# Patient Record
Sex: FEMALE | Race: WHITE | NOT HISPANIC OR LATINO | Employment: OTHER | URBAN - METROPOLITAN AREA
[De-identification: names, ages, dates, MRNs, and addresses within clinical notes are randomized per-mention and may not be internally consistent; named-entity substitution may affect disease eponyms.]

---

## 2023-03-03 PROBLEM — R94.31 ABNORMAL ECG: Status: ACTIVE | Noted: 2023-03-03

## 2023-03-03 PROBLEM — R42 LIGHTHEADEDNESS: Status: ACTIVE | Noted: 2023-03-03

## 2023-03-03 PROBLEM — R76.8 ANA POSITIVE: Status: ACTIVE | Noted: 2023-03-03

## 2023-03-03 PROBLEM — L50.9 URTICARIA: Status: ACTIVE | Noted: 2023-03-03

## 2023-03-03 PROBLEM — G47.33 OBSTRUCTIVE SLEEP APNEA, ADULT: Status: ACTIVE | Noted: 2023-03-03

## 2023-03-03 PROBLEM — R53.82 CHRONIC FATIGUE: Status: ACTIVE | Noted: 2023-03-03

## 2023-03-03 PROBLEM — L30.9 ECZEMA: Status: ACTIVE | Noted: 2023-03-03

## 2023-03-03 PROBLEM — J32.3 SPHENOID SINUSITIS: Status: ACTIVE | Noted: 2023-03-03

## 2023-03-03 PROBLEM — E23.7 PITUITARY ABNORMALITY (MULTI): Status: ACTIVE | Noted: 2023-03-03

## 2023-03-03 PROBLEM — G93.89 SUPRASELLAR MASS: Status: ACTIVE | Noted: 2023-03-03

## 2023-03-03 PROBLEM — D35.2 PITUITARY ADENOMA (MULTI): Status: ACTIVE | Noted: 2023-03-03

## 2023-03-03 PROBLEM — J34.2 DEVIATED NASAL SEPTUM: Status: ACTIVE | Noted: 2023-03-03

## 2023-03-03 PROBLEM — J32.2 ETHMOID SINUSITIS: Status: ACTIVE | Noted: 2023-03-03

## 2023-03-03 PROBLEM — R43.8 DECREASED SENSE OF SMELL: Status: ACTIVE | Noted: 2023-03-03

## 2023-03-03 PROBLEM — I10 HTN (HYPERTENSION): Status: ACTIVE | Noted: 2023-03-03

## 2023-03-03 PROBLEM — R06.83 SNORING: Status: ACTIVE | Noted: 2023-03-03

## 2023-03-03 PROBLEM — R09.82 POSTNASAL DRIP: Status: ACTIVE | Noted: 2023-03-03

## 2023-03-03 PROBLEM — R92.8 ABNORMALITY OF LEFT BREAST ON SCREENING MAMMOGRAM: Status: ACTIVE | Noted: 2023-03-03

## 2023-03-03 PROBLEM — Z86.39 HISTORY OF PITUITARY DISEASE: Status: ACTIVE | Noted: 2023-03-03

## 2023-03-03 PROBLEM — R09.81 SINUS CONGESTION: Status: ACTIVE | Noted: 2023-03-03

## 2023-03-03 PROBLEM — S86.111A: Status: ACTIVE | Noted: 2023-03-03

## 2023-03-03 PROBLEM — N95.1 MENOPAUSAL SYMPTOMS: Status: ACTIVE | Noted: 2023-03-03

## 2023-03-03 PROBLEM — M13.0 ARTHRITIS OF MULTIPLE SITES: Status: ACTIVE | Noted: 2023-03-03

## 2023-03-03 PROBLEM — D17.23 LIPOMA OF RIGHT LOWER EXTREMITY: Status: ACTIVE | Noted: 2023-03-03

## 2023-03-03 RX ORDER — ACETAMINOPHEN 500 MG
TABLET ORAL
COMMUNITY
End: 2023-03-07

## 2023-03-03 RX ORDER — METOPROLOL SUCCINATE 50 MG/1
3 TABLET, EXTENDED RELEASE ORAL DAILY
COMMUNITY
Start: 2021-10-11 | End: 2024-01-03

## 2023-03-03 RX ORDER — LOSARTAN POTASSIUM 100 MG/1
100 TABLET ORAL DAILY
COMMUNITY
End: 2023-06-06 | Stop reason: SDUPTHER

## 2023-03-07 ENCOUNTER — OFFICE VISIT (OUTPATIENT)
Dept: PRIMARY CARE | Facility: CLINIC | Age: 73
End: 2023-03-07
Payer: MEDICARE

## 2023-03-07 VITALS
DIASTOLIC BLOOD PRESSURE: 96 MMHG | SYSTOLIC BLOOD PRESSURE: 160 MMHG | WEIGHT: 225 LBS | OXYGEN SATURATION: 97 % | HEART RATE: 70 BPM | BODY MASS INDEX: 37.44 KG/M2

## 2023-03-07 DIAGNOSIS — I87.2 VENOUS STASIS DERMATITIS OF BOTH LOWER EXTREMITIES: Primary | ICD-10-CM

## 2023-03-07 DIAGNOSIS — I10 HYPERTENSION, UNSPECIFIED TYPE: ICD-10-CM

## 2023-03-07 PROCEDURE — 3080F DIAST BP >= 90 MM HG: CPT | Performed by: STUDENT IN AN ORGANIZED HEALTH CARE EDUCATION/TRAINING PROGRAM

## 2023-03-07 PROCEDURE — 1159F MED LIST DOCD IN RCRD: CPT | Performed by: STUDENT IN AN ORGANIZED HEALTH CARE EDUCATION/TRAINING PROGRAM

## 2023-03-07 PROCEDURE — 99213 OFFICE O/P EST LOW 20 MIN: CPT | Performed by: STUDENT IN AN ORGANIZED HEALTH CARE EDUCATION/TRAINING PROGRAM

## 2023-03-07 PROCEDURE — 1160F RVW MEDS BY RX/DR IN RCRD: CPT | Performed by: STUDENT IN AN ORGANIZED HEALTH CARE EDUCATION/TRAINING PROGRAM

## 2023-03-07 PROCEDURE — 3077F SYST BP >= 140 MM HG: CPT | Performed by: STUDENT IN AN ORGANIZED HEALTH CARE EDUCATION/TRAINING PROGRAM

## 2023-03-07 RX ORDER — SODIUM CHLORIDE 0.65 %
AEROSOL, SPRAY (ML) NASAL 4 TIMES DAILY
COMMUNITY
Start: 2022-05-18 | End: 2023-03-07 | Stop reason: ALTCHOICE

## 2023-03-07 RX ORDER — HYDROCORTISONE 10 MG/1
TABLET ORAL
COMMUNITY
Start: 2022-06-14 | End: 2023-03-07

## 2023-03-07 RX ORDER — HYDROCORTISONE 25 MG/G
CREAM TOPICAL 2 TIMES DAILY PRN
Qty: 30 G | Refills: 2 | Status: SHIPPED | OUTPATIENT
Start: 2023-03-07 | End: 2024-01-03 | Stop reason: ALTCHOICE

## 2023-03-07 RX ORDER — AMLODIPINE BESYLATE 5 MG/1
1 TABLET ORAL DAILY
COMMUNITY
End: 2023-03-07 | Stop reason: SINTOL

## 2023-03-07 RX ORDER — HYDROXYZINE HYDROCHLORIDE 50 MG/1
50 TABLET, FILM COATED ORAL NIGHTLY
COMMUNITY
Start: 2022-10-15 | End: 2023-03-07 | Stop reason: SDUPTHER

## 2023-03-07 RX ORDER — AMOXICILLIN 500 MG/1
1 TABLET, FILM COATED ORAL 2 TIMES DAILY
COMMUNITY
Start: 2022-06-03 | End: 2023-03-07

## 2023-03-07 RX ORDER — CEPHALEXIN 500 MG/1
CAPSULE ORAL
COMMUNITY
Start: 2022-09-21 | End: 2023-03-07

## 2023-03-07 RX ORDER — PREDNISONE 20 MG/1
TABLET ORAL
COMMUNITY
Start: 2022-09-25 | End: 2023-03-07

## 2023-03-07 RX ORDER — ACETAMINOPHEN 325 MG/1
TABLET ORAL EVERY 6 HOURS PRN
COMMUNITY
Start: 2022-05-18 | End: 2024-01-03 | Stop reason: ALTCHOICE

## 2023-03-07 RX ORDER — HYDROXYZINE HYDROCHLORIDE 50 MG/1
50 TABLET, FILM COATED ORAL NIGHTLY
Qty: 30 TABLET | Refills: 2 | Status: SHIPPED | OUTPATIENT
Start: 2023-03-07 | End: 2023-03-30

## 2023-03-07 RX ORDER — SPIRONOLACTONE 50 MG/1
TABLET, FILM COATED ORAL
COMMUNITY
End: 2023-03-07 | Stop reason: ALTCHOICE

## 2023-03-07 RX ORDER — LOSARTAN POTASSIUM 50 MG/1
1 TABLET ORAL DAILY
COMMUNITY
Start: 2022-12-15 | End: 2023-03-07

## 2023-03-07 RX ORDER — HYDROCHLOROTHIAZIDE 25 MG/1
25 TABLET ORAL DAILY
COMMUNITY
Start: 2022-10-13 | End: 2023-03-07

## 2023-03-07 NOTE — PROGRESS NOTES
Subjective   Patient ID: Pattie Gutierrez is a 72 y.o. female who presents for Rash (Rash on both legs.).    HPI 72-year-old female comes in for recurrent leg rash over the past week and a half.      Review of Systems  Constitutional: NO F, chills, or sweats    Integumentary: concern for rash    Objective   BP (!) 160/96 (BP Location: Left arm, Patient Position: Sitting, BP Cuff Size: Large adult)   Pulse 70   Wt 102 kg (225 lb)   SpO2 97%   BMI 37.44 kg/m²     Physical Exam  gen- a & o x 3, nad, pleasant    skin-positive venous dermatitis rash bilateral shins    Assessment/Plan     #1.  Venous stasis dermatitis flareup of the rash on the bilateral shins.  Prescriptions given today.  She has dermatology follow-up next week

## 2023-03-07 NOTE — PATIENT INSTRUCTIONS
1.  Venous stasis dermatitis.  Prescription for hydrocortisone cream use twice daily.  Can use Atarax as needed to help with itching.  She has dermatology follow-up in 5 days.    2.  Referral to reestablish care with cardiology

## 2023-03-29 DIAGNOSIS — I87.2 VENOUS STASIS DERMATITIS OF BOTH LOWER EXTREMITIES: ICD-10-CM

## 2023-03-29 RX ORDER — TRIAMCINOLONE ACETONIDE 1 MG/G
CREAM TOPICAL
COMMUNITY
Start: 2023-03-16 | End: 2024-01-03 | Stop reason: ALTCHOICE

## 2023-03-29 RX ORDER — ACETAMINOPHEN 500 MG
TABLET ORAL
COMMUNITY
End: 2024-01-03 | Stop reason: ALTCHOICE

## 2023-03-29 RX ORDER — NEBIVOLOL 20 MG/1
1 TABLET ORAL DAILY
COMMUNITY
Start: 2023-03-20 | End: 2024-01-22 | Stop reason: SDUPTHER

## 2023-03-29 RX ORDER — LOSARTAN POTASSIUM 100 MG/1
1 TABLET ORAL DAILY
COMMUNITY
Start: 2022-09-16 | End: 2024-02-05 | Stop reason: SDUPTHER

## 2023-03-30 RX ORDER — HYDROXYZINE HYDROCHLORIDE 50 MG/1
50 TABLET, FILM COATED ORAL NIGHTLY
Qty: 30 TABLET | Refills: 2 | Status: SHIPPED | OUTPATIENT
Start: 2023-03-30 | End: 2024-01-03 | Stop reason: ALTCHOICE

## 2023-04-26 LAB
ANION GAP IN SER/PLAS: 13 MMOL/L (ref 10–20)
CALCIUM (MG/DL) IN SER/PLAS: 9.9 MG/DL (ref 8.6–10.6)
CARBON DIOXIDE, TOTAL (MMOL/L) IN SER/PLAS: 31 MMOL/L (ref 21–32)
CHLORIDE (MMOL/L) IN SER/PLAS: 100 MMOL/L (ref 98–107)
CREATININE (MG/DL) IN SER/PLAS: 0.88 MG/DL (ref 0.5–1.05)
GFR FEMALE: 69 ML/MIN/1.73M2
GLUCOSE (MG/DL) IN SER/PLAS: 80 MG/DL (ref 74–99)
POTASSIUM (MMOL/L) IN SER/PLAS: 4.4 MMOL/L (ref 3.5–5.3)
SODIUM (MMOL/L) IN SER/PLAS: 140 MMOL/L (ref 136–145)
UREA NITROGEN (MG/DL) IN SER/PLAS: 17 MG/DL (ref 6–23)

## 2023-05-23 DIAGNOSIS — I10 PRIMARY HYPERTENSION: Primary | ICD-10-CM

## 2023-05-23 RX ORDER — HYDROCHLOROTHIAZIDE 25 MG/1
25 TABLET ORAL DAILY
COMMUNITY
Start: 2023-04-17 | End: 2023-05-23 | Stop reason: SDUPTHER

## 2023-05-23 RX ORDER — HYDROCHLOROTHIAZIDE 25 MG/1
25 TABLET ORAL DAILY
Qty: 90 TABLET | Refills: 3 | Status: SHIPPED | OUTPATIENT
Start: 2023-05-23 | End: 2024-05-09

## 2023-05-23 RX ORDER — HYDRALAZINE HYDROCHLORIDE 25 MG/1
25 TABLET, FILM COATED ORAL 2 TIMES DAILY
COMMUNITY
Start: 2023-04-27 | End: 2024-05-09 | Stop reason: SDUPTHER

## 2023-06-06 ENCOUNTER — OFFICE VISIT (OUTPATIENT)
Dept: PRIMARY CARE | Facility: CLINIC | Age: 73
End: 2023-06-06
Payer: MEDICARE

## 2023-06-06 VITALS
WEIGHT: 225 LBS | OXYGEN SATURATION: 97 % | BODY MASS INDEX: 37.44 KG/M2 | SYSTOLIC BLOOD PRESSURE: 132 MMHG | HEART RATE: 67 BPM | DIASTOLIC BLOOD PRESSURE: 84 MMHG

## 2023-06-06 DIAGNOSIS — S00.03XA HEMATOMA OF RIGHT PARIETAL SCALP, INITIAL ENCOUNTER: Primary | ICD-10-CM

## 2023-06-06 PROBLEM — R06.02 SOB (SHORTNESS OF BREATH): Status: ACTIVE | Noted: 2023-06-06

## 2023-06-06 PROBLEM — R55 PRE-SYNCOPE: Status: ACTIVE | Noted: 2023-06-06

## 2023-06-06 PROBLEM — R05.3 CHRONIC COUGH: Status: ACTIVE | Noted: 2023-06-06

## 2023-06-06 PROCEDURE — 3079F DIAST BP 80-89 MM HG: CPT | Performed by: STUDENT IN AN ORGANIZED HEALTH CARE EDUCATION/TRAINING PROGRAM

## 2023-06-06 PROCEDURE — 3075F SYST BP GE 130 - 139MM HG: CPT | Performed by: STUDENT IN AN ORGANIZED HEALTH CARE EDUCATION/TRAINING PROGRAM

## 2023-06-06 PROCEDURE — 1160F RVW MEDS BY RX/DR IN RCRD: CPT | Performed by: STUDENT IN AN ORGANIZED HEALTH CARE EDUCATION/TRAINING PROGRAM

## 2023-06-06 PROCEDURE — 99213 OFFICE O/P EST LOW 20 MIN: CPT | Performed by: STUDENT IN AN ORGANIZED HEALTH CARE EDUCATION/TRAINING PROGRAM

## 2023-06-06 PROCEDURE — 1159F MED LIST DOCD IN RCRD: CPT | Performed by: STUDENT IN AN ORGANIZED HEALTH CARE EDUCATION/TRAINING PROGRAM

## 2023-06-06 NOTE — PROGRESS NOTES
Subjective   Patient ID: Pattie Gutierrez is a 73 y.o. female who presents for Mass (Mass on back of head.).    HPI comes in with 4 days of an atypical lump right posterior scalp.  Denies any trauma mild discomfort when palpating otherwise no pain no redness no tenderness.  It is in the scalp line, hairline    Review of Systems  Constitutional: NO F, chills, or sweats  Eyes: no blurred vision or visual disturbance  ENT: no hearing loss, no congestion, no nasal discharge, no hoarseness and no sore throat.   Cardiovascular: no chest pain, no edema, no palps and no syncope.   Respiratory: no cough,no s.o.b. and no wheezing  Gastrointestinal: no abdominal pain, No C/D no N/V, no blood in stools  Genitourinary: no dysuria, no change in urinary frequency, no urinary hesitancy and no feelings of urinary urgency.   Musculoskeletal: no arthralgias,  no back pain and no myalgias.   Integumentary: Concern for scalp lump  Objective   /84 (BP Location: Left arm, Patient Position: Sitting, BP Cuff Size: Large adult)   Pulse 67   Wt 102 kg (225 lb)   SpO2 97%   BMI 37.44 kg/m²     Physical Exam  gen- a & o x 3, nad, pleasant  Derm-right posterior scalp behind the right ear, there is a small 2 x 2 centimeter hematoma mildly tender there is no erythema no exudate  Assessment/Plan     #1.  Scalp hematoma watchful waiting at this time.  Icing twice per day.  We did discuss possible ultrasound if for some reason symptoms worsen.        no

## 2023-09-22 ENCOUNTER — TELEPHONE (OUTPATIENT)
Dept: ENT CLINIC | Age: 73
End: 2023-09-22

## 2024-01-03 ENCOUNTER — OFFICE VISIT (OUTPATIENT)
Dept: PRIMARY CARE | Facility: CLINIC | Age: 74
End: 2024-01-03
Payer: MEDICARE

## 2024-01-03 VITALS
OXYGEN SATURATION: 91 % | TEMPERATURE: 97.9 F | DIASTOLIC BLOOD PRESSURE: 78 MMHG | WEIGHT: 228.4 LBS | HEIGHT: 65 IN | BODY MASS INDEX: 38.05 KG/M2 | HEART RATE: 93 BPM | SYSTOLIC BLOOD PRESSURE: 122 MMHG

## 2024-01-03 DIAGNOSIS — Z13.6 ENCOUNTER FOR SCREENING FOR CARDIOVASCULAR DISORDERS: ICD-10-CM

## 2024-01-03 DIAGNOSIS — E55.9 VITAMIN D DEFICIENCY: ICD-10-CM

## 2024-01-03 DIAGNOSIS — R53.82 CHRONIC FATIGUE: ICD-10-CM

## 2024-01-03 DIAGNOSIS — Z00.00 WELLNESS EXAMINATION: Primary | ICD-10-CM

## 2024-01-03 DIAGNOSIS — I10 HYPERTENSION, UNSPECIFIED TYPE: ICD-10-CM

## 2024-01-03 DIAGNOSIS — Z13.29 SCREENING FOR THYROID DISORDER: ICD-10-CM

## 2024-01-03 DIAGNOSIS — Z13.0 SCREENING FOR DEFICIENCY ANEMIA: ICD-10-CM

## 2024-01-03 LAB
25(OH)D3 SERPL-MCNC: 34 NG/ML (ref 30–100)
ALBUMIN SERPL BCP-MCNC: 4.3 G/DL (ref 3.4–5)
ALP SERPL-CCNC: 59 U/L (ref 33–136)
ALT SERPL W P-5'-P-CCNC: 25 U/L (ref 7–45)
ANION GAP SERPL CALC-SCNC: 14 MMOL/L (ref 10–20)
AST SERPL W P-5'-P-CCNC: 23 U/L (ref 9–39)
BASOPHILS # BLD AUTO: 0.04 X10*3/UL (ref 0–0.1)
BASOPHILS NFR BLD AUTO: 0.4 %
BILIRUB SERPL-MCNC: 0.8 MG/DL (ref 0–1.2)
BUN SERPL-MCNC: 14 MG/DL (ref 6–23)
CALCIUM SERPL-MCNC: 10.1 MG/DL (ref 8.6–10.6)
CHLORIDE SERPL-SCNC: 103 MMOL/L (ref 98–107)
CHOLEST SERPL-MCNC: 208 MG/DL (ref 0–199)
CHOLESTEROL/HDL RATIO: 5.2
CO2 SERPL-SCNC: 27 MMOL/L (ref 21–32)
CREAT SERPL-MCNC: 0.99 MG/DL (ref 0.5–1.05)
EOSINOPHIL # BLD AUTO: 0.19 X10*3/UL (ref 0–0.4)
EOSINOPHIL NFR BLD AUTO: 2 %
ERYTHROCYTE [DISTWIDTH] IN BLOOD BY AUTOMATED COUNT: 13.5 % (ref 11.5–14.5)
GFR SERPL CREATININE-BSD FRML MDRD: 60 ML/MIN/1.73M*2
GLUCOSE SERPL-MCNC: 89 MG/DL (ref 74–99)
HCT VFR BLD AUTO: 45.5 % (ref 36–46)
HDLC SERPL-MCNC: 39.8 MG/DL
HGB BLD-MCNC: 15.4 G/DL (ref 12–16)
IMM GRANULOCYTES # BLD AUTO: 0.03 X10*3/UL (ref 0–0.5)
IMM GRANULOCYTES NFR BLD AUTO: 0.3 % (ref 0–0.9)
LDLC SERPL CALC-MCNC: 122 MG/DL
LYMPHOCYTES # BLD AUTO: 2.38 X10*3/UL (ref 0.8–3)
LYMPHOCYTES NFR BLD AUTO: 24.6 %
MCH RBC QN AUTO: 30.6 PG (ref 26–34)
MCHC RBC AUTO-ENTMCNC: 33.8 G/DL (ref 32–36)
MCV RBC AUTO: 90 FL (ref 80–100)
MONOCYTES # BLD AUTO: 0.61 X10*3/UL (ref 0.05–0.8)
MONOCYTES NFR BLD AUTO: 6.3 %
NEUTROPHILS # BLD AUTO: 6.41 X10*3/UL (ref 1.6–5.5)
NEUTROPHILS NFR BLD AUTO: 66.4 %
NON HDL CHOLESTEROL: 168 MG/DL (ref 0–149)
NRBC BLD-RTO: 0 /100 WBCS (ref 0–0)
PLATELET # BLD AUTO: 284 X10*3/UL (ref 150–450)
POTASSIUM SERPL-SCNC: 3.8 MMOL/L (ref 3.5–5.3)
PROT SERPL-MCNC: 6.9 G/DL (ref 6.4–8.2)
RBC # BLD AUTO: 5.04 X10*6/UL (ref 4–5.2)
SODIUM SERPL-SCNC: 140 MMOL/L (ref 136–145)
T4 FREE SERPL-MCNC: 1 NG/DL (ref 0.78–1.48)
TRIGL SERPL-MCNC: 230 MG/DL (ref 0–149)
TSH SERPL-ACNC: 5.07 MIU/L (ref 0.44–3.98)
VIT B12 SERPL-MCNC: 530 PG/ML (ref 211–911)
VLDL: 46 MG/DL (ref 0–40)
WBC # BLD AUTO: 9.7 X10*3/UL (ref 4.4–11.3)

## 2024-01-03 PROCEDURE — 82306 VITAMIN D 25 HYDROXY: CPT

## 2024-01-03 PROCEDURE — 36415 COLL VENOUS BLD VENIPUNCTURE: CPT

## 2024-01-03 PROCEDURE — 84439 ASSAY OF FREE THYROXINE: CPT

## 2024-01-03 PROCEDURE — 1159F MED LIST DOCD IN RCRD: CPT | Performed by: STUDENT IN AN ORGANIZED HEALTH CARE EDUCATION/TRAINING PROGRAM

## 2024-01-03 PROCEDURE — 3074F SYST BP LT 130 MM HG: CPT | Performed by: STUDENT IN AN ORGANIZED HEALTH CARE EDUCATION/TRAINING PROGRAM

## 2024-01-03 PROCEDURE — 84443 ASSAY THYROID STIM HORMONE: CPT

## 2024-01-03 PROCEDURE — 80053 COMPREHEN METABOLIC PANEL: CPT

## 2024-01-03 PROCEDURE — 1126F AMNT PAIN NOTED NONE PRSNT: CPT | Performed by: STUDENT IN AN ORGANIZED HEALTH CARE EDUCATION/TRAINING PROGRAM

## 2024-01-03 PROCEDURE — G0439 PPPS, SUBSEQ VISIT: HCPCS | Performed by: STUDENT IN AN ORGANIZED HEALTH CARE EDUCATION/TRAINING PROGRAM

## 2024-01-03 PROCEDURE — 80061 LIPID PANEL: CPT

## 2024-01-03 PROCEDURE — 1160F RVW MEDS BY RX/DR IN RCRD: CPT | Performed by: STUDENT IN AN ORGANIZED HEALTH CARE EDUCATION/TRAINING PROGRAM

## 2024-01-03 PROCEDURE — 99214 OFFICE O/P EST MOD 30 MIN: CPT | Performed by: STUDENT IN AN ORGANIZED HEALTH CARE EDUCATION/TRAINING PROGRAM

## 2024-01-03 PROCEDURE — 1170F FXNL STATUS ASSESSED: CPT | Performed by: STUDENT IN AN ORGANIZED HEALTH CARE EDUCATION/TRAINING PROGRAM

## 2024-01-03 PROCEDURE — 82607 VITAMIN B-12: CPT

## 2024-01-03 PROCEDURE — 3078F DIAST BP <80 MM HG: CPT | Performed by: STUDENT IN AN ORGANIZED HEALTH CARE EDUCATION/TRAINING PROGRAM

## 2024-01-03 PROCEDURE — 85025 COMPLETE CBC W/AUTO DIFF WBC: CPT

## 2024-01-03 RX ORDER — DOCUSATE SODIUM 50MG AND SENNOSIDES 8.6MG 8.6; 5 MG/1; MG/1
1 TABLET, FILM COATED ORAL DAILY
COMMUNITY
Start: 2023-08-16 | End: 2024-01-03 | Stop reason: ALTCHOICE

## 2024-01-03 RX ORDER — LOSARTAN POTASSIUM AND HYDROCHLOROTHIAZIDE 12.5; 5 MG/1; MG/1
TABLET ORAL EVERY 24 HOURS
COMMUNITY
End: 2024-01-03 | Stop reason: ALTCHOICE

## 2024-01-03 RX ORDER — OXYCODONE HYDROCHLORIDE 5 MG/1
TABLET ORAL
COMMUNITY
Start: 2023-08-16 | End: 2024-01-03 | Stop reason: ALTCHOICE

## 2024-01-03 RX ORDER — DEXTROMETHORPHAN HYDROBROMIDE, GUAIFENESIN 5; 100 MG/5ML; MG/5ML
LIQUID ORAL
COMMUNITY
End: 2024-01-03 | Stop reason: ALTCHOICE

## 2024-01-03 RX ORDER — VIBEGRON 75 MG/1
75 TABLET, FILM COATED ORAL
COMMUNITY
Start: 2024-01-02 | End: 2024-05-09 | Stop reason: WASHOUT

## 2024-01-03 ASSESSMENT — ACTIVITIES OF DAILY LIVING (ADL)
GROCERY_SHOPPING: INDEPENDENT
DOING_HOUSEWORK: INDEPENDENT
MANAGING_FINANCES: INDEPENDENT
TAKING_MEDICATION: INDEPENDENT
DRESSING: INDEPENDENT
BATHING: INDEPENDENT

## 2024-01-03 ASSESSMENT — PATIENT HEALTH QUESTIONNAIRE - PHQ9
SUM OF ALL RESPONSES TO PHQ9 QUESTIONS 1 AND 2: 0
2. FEELING DOWN, DEPRESSED OR HOPELESS: NOT AT ALL
1. LITTLE INTEREST OR PLEASURE IN DOING THINGS: NOT AT ALL

## 2024-01-03 ASSESSMENT — ENCOUNTER SYMPTOMS
LOSS OF SENSATION IN FEET: 0
OCCASIONAL FEELINGS OF UNSTEADINESS: 0
DEPRESSION: 0

## 2024-01-03 ASSESSMENT — PAIN SCALES - GENERAL: PAINLEVEL: 0-NO PAIN

## 2024-01-03 NOTE — PROGRESS NOTES
"Subjective   Patient ID: Pattie Gutierrez is a 73 y.o. female who presents for Annual Exam (Assessment annual medicare wellness fasting bw).    HPI comes in for wellness    Review of Systems  Constitutional: NO F, chills, or sweats  Eyes: no blurred vision or visual disturbance  ENT: no hearing loss, no congestion, no nasal discharge, no hoarseness and no sore throat.   Cardiovascular: no chest pain, no edema, no palps and no syncope.   Respiratory: no cough,no s.o.b. and no wheezing  Gastrointestinal: no abdominal pain, No C/D no N/V, no blood in stools  Genitourinary: no dysuria, no change in urinary frequency, no urinary hesitancy and no feelings of urinary urgency.   Musculoskeletal: no arthralgias,  no back pain and no myalgias.   Integumentary: no new skin lesions and no rashes.   Neurological: no difficulty walking, no headache, no limb weakness, no numbness and no tingling.   Psychiatric: no anxiety, no depression, no anhedonia and no substance use disorders.   Endocrine: no recent weight gain and no recent weight loss.   Hematologic/Lymphatic: no tendency for easy bruising and no swollen glands.  Objective   /78 (BP Location: Left arm)   Pulse 93   Temp 36.6 °C (97.9 °F) (Temporal)   Ht 1.651 m (5' 5\")   Wt 104 kg (228 lb 6.4 oz)   SpO2 91%   BMI 38.01 kg/m²     Physical Exam  gen- a & o x 3, nad, pleasant  heent- eomi, perrla, ear canals patent, TM's non-erythematous, no fluid, frontal and maxillary sinus's nontender  neck- supple, nontender, no palpable or enlarged nodes, no thyromegaly  heart- rrr, no murmurs  lungs- cta b/l , no w/r/r  chest- symmetric, nontender  ab- soft, nontender, no palpable organomegaly, postive bowel sounds  ex's- no c/c/e  neuro- CNs 2-12 grossly intact, full sensation and strength in all extremities    Assessment/Plan     1.  Medicare wellness.  No concerns on exam.  Screening labs ordered today.  Mammogram was October 2022.  Colonoscopy was in 2023.  Defers on shots at " this time.    2.  History of hypertension.  Stable on current meds please call if refills needed.    Continue visits with specialist providers.    #3.  History of pituitary tumor with resection.  MRI in September showed possible residual tumor.  A follow-up MRI was ordered by specialist provider order printed today.

## 2024-01-03 NOTE — PATIENT INSTRUCTIONS
1.  Medicare wellness.  No concerns on exam.  Screening labs ordered today.  Mammogram was October 2022.  Colonoscopy was in 2023.  Defers on shots at this time.    2.  History of hypertension.  Stable on current meds please call if refills needed.    Continue visits with specialist providers.    #3.  History of pituitary tumor with resection.  MRI in September showed possible residual tumor.  A follow-up MRI was ordered by specialist provider order printed today.    Brian Begum DO  for questions and f/u please call office   [place] [phone] [facility] [phone]  any forms needed please fax   [place] [phone] [facility] [phone]  for Physical therapy orders can call [phone]  for Radiology orders can call [phone]  for general referrals can call [phone]  for cardiac testing can call [phone]  for GI testing call [phone]

## 2024-01-22 DIAGNOSIS — I10 HYPERTENSION, UNSPECIFIED TYPE: Primary | ICD-10-CM

## 2024-01-22 RX ORDER — NEBIVOLOL 20 MG/1
20 TABLET ORAL DAILY
Qty: 90 TABLET | Refills: 1 | Status: SHIPPED | OUTPATIENT
Start: 2024-01-22

## 2024-01-23 ENCOUNTER — SPECIALTY PHARMACY (OUTPATIENT)
Dept: PHARMACY | Facility: CLINIC | Age: 74
End: 2024-01-23

## 2024-02-05 DIAGNOSIS — I10 HYPERTENSION, UNSPECIFIED TYPE: Primary | ICD-10-CM

## 2024-02-05 RX ORDER — LOSARTAN POTASSIUM 100 MG/1
100 TABLET ORAL DAILY
Qty: 90 TABLET | Refills: 3 | Status: SHIPPED | OUTPATIENT
Start: 2024-02-05

## 2024-02-15 ENCOUNTER — TELEPHONE (OUTPATIENT)
Dept: CARDIOLOGY | Facility: CLINIC | Age: 74
End: 2024-02-15
Payer: MEDICARE

## 2024-02-15 ENCOUNTER — SPECIALTY PHARMACY (OUTPATIENT)
Dept: PHARMACY | Facility: CLINIC | Age: 74
End: 2024-02-15

## 2024-02-15 NOTE — TELEPHONE ENCOUNTER
Pt left message saying uh specialty pharmacy couldn't really do much better on price for her nebivolol so she is going to [facility]

## 2024-04-25 RX ORDER — HYDRALAZINE HYDROCHLORIDE 25 MG/1
TABLET, FILM COATED ORAL
Refills: 0 | OUTPATIENT
Start: 2024-04-25

## 2024-05-09 ENCOUNTER — OFFICE VISIT (OUTPATIENT)
Dept: CARDIOLOGY | Facility: CLINIC | Age: 74
End: 2024-05-09
Payer: MEDICARE

## 2024-05-09 ENCOUNTER — LAB (OUTPATIENT)
Dept: LAB | Facility: LAB | Age: 74
End: 2024-05-09
Payer: MEDICARE

## 2024-05-09 VITALS
HEART RATE: 70 BPM | BODY MASS INDEX: 37.99 KG/M2 | HEIGHT: 65 IN | SYSTOLIC BLOOD PRESSURE: 119 MMHG | WEIGHT: 228 LBS | DIASTOLIC BLOOD PRESSURE: 76 MMHG

## 2024-05-09 DIAGNOSIS — I10 PRIMARY HYPERTENSION: Primary | ICD-10-CM

## 2024-05-09 DIAGNOSIS — E55.9 VITAMIN D DEFICIENCY: ICD-10-CM

## 2024-05-09 DIAGNOSIS — R53.83 FATIGUE, UNSPECIFIED TYPE: ICD-10-CM

## 2024-05-09 DIAGNOSIS — E78.2 MIXED HYPERLIPIDEMIA: ICD-10-CM

## 2024-05-09 DIAGNOSIS — I71.21 ANEURYSM OF ASCENDING AORTA WITHOUT RUPTURE (CMS-HCC): ICD-10-CM

## 2024-05-09 LAB
25(OH)D3 SERPL-MCNC: 36 NG/ML (ref 30–100)
ANION GAP SERPL CALC-SCNC: 16 MMOL/L (ref 10–20)
BUN SERPL-MCNC: 20 MG/DL (ref 6–23)
CALCIUM SERPL-MCNC: 9.9 MG/DL (ref 8.6–10.6)
CHLORIDE SERPL-SCNC: 100 MMOL/L (ref 98–107)
CO2 SERPL-SCNC: 28 MMOL/L (ref 21–32)
CREAT SERPL-MCNC: 1.02 MG/DL (ref 0.5–1.05)
EGFRCR SERPLBLD CKD-EPI 2021: 57 ML/MIN/1.73M*2
ERYTHROCYTE [DISTWIDTH] IN BLOOD BY AUTOMATED COUNT: 13.6 % (ref 11.5–14.5)
GLUCOSE SERPL-MCNC: 179 MG/DL (ref 74–99)
HCT VFR BLD AUTO: 44.6 % (ref 36–46)
HGB BLD-MCNC: 15.2 G/DL (ref 12–16)
MCH RBC QN AUTO: 30.3 PG (ref 26–34)
MCHC RBC AUTO-ENTMCNC: 34.1 G/DL (ref 32–36)
MCV RBC AUTO: 89 FL (ref 80–100)
NRBC BLD-RTO: 0 /100 WBCS (ref 0–0)
PLATELET # BLD AUTO: 310 X10*3/UL (ref 150–450)
POTASSIUM SERPL-SCNC: 3.8 MMOL/L (ref 3.5–5.3)
RBC # BLD AUTO: 5.01 X10*6/UL (ref 4–5.2)
SODIUM SERPL-SCNC: 140 MMOL/L (ref 136–145)
T4 FREE SERPL-MCNC: 1.08 NG/DL (ref 0.78–1.48)
TSH SERPL-ACNC: 5.19 MIU/L (ref 0.44–3.98)
WBC # BLD AUTO: 7.7 X10*3/UL (ref 4.4–11.3)

## 2024-05-09 PROCEDURE — 36415 COLL VENOUS BLD VENIPUNCTURE: CPT

## 2024-05-09 PROCEDURE — 85027 COMPLETE CBC AUTOMATED: CPT

## 2024-05-09 PROCEDURE — 82306 VITAMIN D 25 HYDROXY: CPT

## 2024-05-09 PROCEDURE — 1160F RVW MEDS BY RX/DR IN RCRD: CPT | Performed by: NURSE PRACTITIONER

## 2024-05-09 PROCEDURE — 99214 OFFICE O/P EST MOD 30 MIN: CPT | Performed by: NURSE PRACTITIONER

## 2024-05-09 PROCEDURE — 84443 ASSAY THYROID STIM HORMONE: CPT

## 2024-05-09 PROCEDURE — 1036F TOBACCO NON-USER: CPT | Performed by: NURSE PRACTITIONER

## 2024-05-09 PROCEDURE — 80048 BASIC METABOLIC PNL TOTAL CA: CPT

## 2024-05-09 PROCEDURE — 1159F MED LIST DOCD IN RCRD: CPT | Performed by: NURSE PRACTITIONER

## 2024-05-09 PROCEDURE — 3078F DIAST BP <80 MM HG: CPT | Performed by: NURSE PRACTITIONER

## 2024-05-09 PROCEDURE — 84439 ASSAY OF FREE THYROXINE: CPT

## 2024-05-09 PROCEDURE — 3074F SYST BP LT 130 MM HG: CPT | Performed by: NURSE PRACTITIONER

## 2024-05-09 RX ORDER — HYDROCHLOROTHIAZIDE 25 MG/1
12.5 TABLET ORAL DAILY
Qty: 45 TABLET | Refills: 3 | Status: SHIPPED | OUTPATIENT
Start: 2024-05-09 | End: 2025-05-09

## 2024-05-09 RX ORDER — HYDRALAZINE HYDROCHLORIDE 25 MG/1
25 TABLET, FILM COATED ORAL 2 TIMES DAILY
Qty: 180 TABLET | Refills: 3 | Status: SHIPPED | OUTPATIENT
Start: 2024-05-09 | End: 2025-05-09

## 2024-05-09 NOTE — PROGRESS NOTES
Chief Complaint:   Hypertension and TAA     History Of Present Illness:    Pattie Gutierrez is a 74 y.o. female here with hypertension and TAA.  Getting a lot of HA.   Fatigue worse 1-2 months. Gradually increasing.   Will feel dizzy from time to time.   Just does not feel well.    TTE 4/3/2023  EF WNL  Moderate LVH  LA moderately dialted  Aortic valve sclerosis  Moderate dilation of ascending aorta (4.38)      Past Medical History:  She has a past medical history of Broken internal right knee prosthesis, initial encounter (CMS-HCC) (01/17/2019), Cellulitis of right lower limb (09/21/2022), Other abnormal and inconclusive findings on diagnostic imaging of breast (08/31/2020), Other adrenocortical insufficiency (Multi) (07/22/2022), Other conditions influencing health status, Personal history of diseases of the blood and blood-forming organs and certain disorders involving the immune mechanism (08/31/2020), Personal history of other diseases of the circulatory system, Personal history of other medical treatment, and Unspecified macular degeneration.    Past Surgical History:  She has a past surgical history that includes Rotator cuff repair (06/06/2017); Total knee arthroplasty (06/06/2017); Hysterectomy (06/06/2017); Appendectomy (06/06/2017); Hand tendon surgery (06/06/2017); and Colonoscopy (06/19/2018).      Social History:  She reports that she has quit smoking. Her smoking use included cigarettes. She does not have any smokeless tobacco history on file. No history on file for alcohol use and drug use.    Family History:  Family History   Problem Relation Name Age of Onset    Hypertension Mother      Hyperthyroidism Mother      Hyperthyroidism Sister      Hypertension Sibling      Kidney disease Sibling      Thyroid disease Sibling       Allergies:  Tramadol, Acetaminophen, Amlodipine, Carvedilol, Cetirizine, Hydrocodone bitartrate, Hydrocodone-acetaminophen, Ibuprofen, Sulfa (sulfonamide antibiotics), and  "Verapamil    Review of Systems  All pertinent systems have been reviewed and are negative except for what is stated in the history of present illness.    All other systems have been reviewed and are negative and noncontributory to this patient's current ailments.     Visit Vitals  /76 (BP Location: Right arm, Patient Position: Sitting, BP Cuff Size: Large adult)   Pulse 70   Ht 1.651 m (5' 5\")   Wt 103 kg (228 lb)   BMI 37.94 kg/m²   Smoking Status Former   BSA 2.17 m²       Last Labs:  CBC -  Lab Results   Component Value Date    WBC 9.7 01/03/2024    HGB 15.4 01/03/2024    HCT 45.5 01/03/2024    MCV 90 01/03/2024     01/03/2024       CMP -  Lab Results   Component Value Date    CALCIUM 10.1 01/03/2024    PHOS 2.9 05/18/2022    PROT 6.9 01/03/2024    ALBUMIN 4.3 01/03/2024    AST 23 01/03/2024    ALT 25 01/03/2024    ALKPHOS 59 01/03/2024    BILITOT 0.8 01/03/2024    BUN 14 01/03/2024    CREATININE 0.99 01/03/2024       LIPID PANEL -   Lab Results   Component Value Date    CHOL 208 (H) 01/03/2024    TRIG 230 (H) 01/03/2024    HDL 39.8 01/03/2024    CHHDL 5.2 01/03/2024    LDLF 122 (H) 09/16/2022    VLDL 46 (H) 01/03/2024    NHDL 168 (H) 01/03/2024       RENAL FUNCTION PANEL -   Lab Results   Component Value Date    GLUCOSE 89 01/03/2024     01/03/2024    K 3.8 01/03/2024     01/03/2024    CO2 27 01/03/2024    ANIONGAP 14 01/03/2024    BUN 14 01/03/2024    CREATININE 0.99 01/03/2024    CALCIUM 10.1 01/03/2024    PHOS 2.9 05/18/2022    ALBUMIN 4.3 01/03/2024        No results found for: \"BNP\", \"HGBA1C\"      Objective   Vitals reviewed.   Constitutional:       Appearance: Healthy appearance. Not in distress.   Eyes:      Conjunctiva/sclera: Conjunctivae normal.   Neck:      Vascular: No JVR. JVD normal.   Pulmonary:      Effort: Pulmonary effort is normal.      Breath sounds: Normal breath sounds. No wheezing. No rhonchi. No rales.   Chest:      Chest wall: Not tender to palpatation. "   Cardiovascular:      PMI at left midclavicular line. Normal rate. Regular rhythm. Normal S1. Normal S2.       Murmurs: There is no murmur.      No gallop.  No click. No rub.   Edema:     Peripheral edema absent.   Abdominal:      General: Bowel sounds are normal.      Palpations: Abdomen is soft.      Tenderness: There is no abdominal tenderness.   Musculoskeletal: Normal range of motion.         General: No tenderness. Skin:     General: Skin is warm and dry.   Neurological:      General: No focal deficit present.      Mental Status: Alert and oriented to person, place and time.   Psychiatric:         Attention and Perception: Attention normal.         Mood and Affect: Mood normal.       Assessment/Plan   Diagnoses and all orders for this visit:  Fatigue, unspecified type  - Feels worse when systolics <110  - will cut hydrochlorothiazide in half  - she will continue to monitor bp at home  - overall bps have been well controlled  - since ongoing for past 2 months we will recheck cbc, tsh, d level  Primary hypertension  - see above  Mixed hyperlipidemia  - we discussed statin, she does not want to start  - she will increase fiber in diet  - she will increase exercise  - we will recheck lipids in 2-3 months  - may need vascepa as well she is agreeable to this  Aneurysm of ascending aorta without rupture (CMS-HCC)  - 4.38 on last year echo   - continue good bp management  Vitamin D deficiency  - Vitamin D 25-Hydroxy,Total (for eval of Vitamin D levels); Future    Follow up in 2-3 weeks      Current Outpatient Medications:     losartan (Cozaar) 100 mg tablet, Take 1 tablet (100 mg) by mouth once daily., Disp: 90 tablet, Rfl: 3    nebivolol (Bystolic) 20 mg tablet, Take 1 tablet (20 mg) by mouth once daily., Disp: 90 tablet, Rfl: 1    hydrALAZINE (Apresoline) 25 mg tablet, Take 1 tablet (25 mg) by mouth 2 times a day. Take with food., Disp: 180 tablet, Rfl: 3    hydroCHLOROthiazide (HYDRODiuril) 25 mg tablet, Take 0.5  tablets (12.5 mg) by mouth once daily. as directed, Disp: 45 tablet, Rfl: 3    Exclusive of any other services or procedures performed, I, Deya HERRING, spent 30 minutes in duration for this visit today.  This time consisted of chart review, obtaining history, and/or performing the exam as documented above, as well as, documenting the clinical information for the encounter in the electronic record, discussing treatment options, plans, and/or goals with patient, family, and/or caregiver, refilling medications, updating the electronic record, ordering medicines, lab work, imaging, referrals, and/or procedures as documented above and communicating with other [facility] professionals. I have discussed the results of laboratory, radiology, and cardiology studies with the patient and their family/caregiver.

## 2024-05-29 ENCOUNTER — OFFICE VISIT (OUTPATIENT)
Dept: CARDIOLOGY | Facility: CLINIC | Age: 74
End: 2024-05-29
Payer: MEDICARE

## 2024-05-29 VITALS
HEART RATE: 58 BPM | WEIGHT: 225.1 LBS | BODY MASS INDEX: 37.5 KG/M2 | HEIGHT: 65 IN | DIASTOLIC BLOOD PRESSURE: 90 MMHG | TEMPERATURE: 98 F | SYSTOLIC BLOOD PRESSURE: 157 MMHG

## 2024-05-29 DIAGNOSIS — I71.21 ANEURYSM OF ASCENDING AORTA WITHOUT RUPTURE (CMS-HCC): ICD-10-CM

## 2024-05-29 DIAGNOSIS — E78.2 MIXED HYPERLIPIDEMIA: ICD-10-CM

## 2024-05-29 DIAGNOSIS — E55.9 VITAMIN D DEFICIENCY: ICD-10-CM

## 2024-05-29 DIAGNOSIS — R53.83 FATIGUE, UNSPECIFIED TYPE: Primary | ICD-10-CM

## 2024-05-29 DIAGNOSIS — I10 PRIMARY HYPERTENSION: ICD-10-CM

## 2024-05-29 PROCEDURE — 3080F DIAST BP >= 90 MM HG: CPT | Performed by: NURSE PRACTITIONER

## 2024-05-29 PROCEDURE — 3077F SYST BP >= 140 MM HG: CPT | Performed by: NURSE PRACTITIONER

## 2024-05-29 PROCEDURE — 1036F TOBACCO NON-USER: CPT | Performed by: NURSE PRACTITIONER

## 2024-05-29 PROCEDURE — 99214 OFFICE O/P EST MOD 30 MIN: CPT | Performed by: NURSE PRACTITIONER

## 2024-05-29 PROCEDURE — 1159F MED LIST DOCD IN RCRD: CPT | Performed by: NURSE PRACTITIONER

## 2024-05-29 PROCEDURE — 1160F RVW MEDS BY RX/DR IN RCRD: CPT | Performed by: NURSE PRACTITIONER

## 2024-05-29 NOTE — PROGRESS NOTES
Chief Complaint:   Hypertension and TAA     History Of Present Illness:    Pattie Gutierrez is a 74 y.o. female here with hypertension and TAA.  Getting a lot of HA.   Fatigue worse 1-2 months. Gradually increasing.   Will feel dizzy from time to time.   Just does not feel well.  I checked labs at last visit. Nothing untoward noted.   Cut hydrochlorothiazide in 1/2 on 5/9, she is here for follow up.   Dizziness is much improved, muscle spasms much improved.   Fatigue improved.     TTE 4/3/2023  EF WNL  Moderate LVH  LA moderately dialted  Aortic valve sclerosis  Moderate dilation of ascending aorta (4.38)      Past Medical History:  She has a past medical history of Broken internal right knee prosthesis, initial encounter (CMS-[facility]) (01/17/2019), Cellulitis of right lower limb (09/21/2022), Other abnormal and inconclusive findings on diagnostic imaging of breast (08/31/2020), Other adrenocortical insufficiency (Multi) (07/22/2022), Other conditions influencing health status, Personal history of diseases of the blood and blood-forming organs and certain disorders involving the immune mechanism (08/31/2020), Personal history of other diseases of the circulatory system, Personal history of other medical treatment, and Unspecified macular degeneration.    Past Surgical History:  She has a past surgical history that includes Rotator cuff repair (06/06/2017); Total knee arthroplasty (06/06/2017); Hysterectomy (06/06/2017); Appendectomy (06/06/2017); Hand tendon surgery (06/06/2017); and Colonoscopy (06/19/2018).      Social History:  She reports that she has quit smoking. Her smoking use included cigarettes. She has never used smokeless tobacco. No history on file for alcohol use and drug use.    Family History:  Family History   Problem Relation Name Age of Onset    Hypertension Mother      Hyperthyroidism Mother      Hyperthyroidism Sister      Hypertension Sibling      Kidney disease Sibling      Thyroid disease Sibling    "    Allergies:  Tramadol, Acetaminophen, Amlodipine, Carvedilol, Cetirizine, Hydrocodone bitartrate, Hydrocodone-acetaminophen, Ibuprofen, Sulfa (sulfonamide antibiotics), and Verapamil    Review of Systems  All pertinent systems have been reviewed and are negative except for what is stated in the history of present illness.    All other systems have been reviewed and are negative and noncontributory to this patient's current ailments.     Visit Vitals  /90   Pulse 58   Temp 36.7 °C (98 °F)   Ht 1.651 m (5' 5\")   Wt 102 kg (225 lb 1.6 oz)   BMI 37.46 kg/m²   Smoking Status Former   BSA 2.16 m²     Last Labs:  CBC -  Lab Results   Component Value Date    WBC 7.7 05/09/2024    HGB 15.2 05/09/2024    HCT 44.6 05/09/2024    MCV 89 05/09/2024     05/09/2024       CMP -  Lab Results   Component Value Date    CALCIUM 9.9 05/09/2024    PHOS 2.9 05/18/2022    PROT 6.9 01/03/2024    ALBUMIN 4.3 01/03/2024    AST 23 01/03/2024    ALT 25 01/03/2024    ALKPHOS 59 01/03/2024    BILITOT 0.8 01/03/2024    BUN 20 05/09/2024    CREATININE 1.02 05/09/2024       LIPID PANEL -   Lab Results   Component Value Date    CHOL 208 (H) 01/03/2024    TRIG 230 (H) 01/03/2024    HDL 39.8 01/03/2024    CHHDL 5.2 01/03/2024    LDLF 122 (H) 09/16/2022    VLDL 46 (H) 01/03/2024    NHDL 168 (H) 01/03/2024       RENAL FUNCTION PANEL -   Lab Results   Component Value Date    GLUCOSE 179 (H) 05/09/2024     05/09/2024    K 3.8 05/09/2024     05/09/2024    CO2 28 05/09/2024    ANIONGAP 16 05/09/2024    BUN 20 05/09/2024    CREATININE 1.02 05/09/2024    CALCIUM 9.9 05/09/2024    PHOS 2.9 05/18/2022    ALBUMIN 4.3 01/03/2024        No results found for: \"BNP\", \"HGBA1C\"      Objective   Vitals reviewed.   Constitutional:       Appearance: Healthy appearance. Not in distress.   Eyes:      Conjunctiva/sclera: Conjunctivae normal.   Neck:      Vascular: No JVR. JVD normal.   Pulmonary:      Effort: Pulmonary effort is normal.      Breath " sounds: Normal breath sounds. No wheezing. No rhonchi. No rales.   Chest:      Chest wall: Not tender to palpatation.   Cardiovascular:      PMI at left midclavicular line. Normal rate. Regular rhythm. Normal S1. Normal S2.       Murmurs: There is no murmur.      No gallop.  No click. No rub.   Edema:     Peripheral edema absent.   Abdominal:      General: Bowel sounds are normal.      Palpations: Abdomen is soft.      Tenderness: There is no abdominal tenderness.   Musculoskeletal: Normal range of motion.         General: No tenderness. Skin:     General: Skin is warm and dry.   Neurological:      General: No focal deficit present.      Mental Status: Alert and oriented to person, place and time.   Psychiatric:         Attention and Perception: Attention normal.         Mood and Affect: Mood normal.       Assessment/Plan   Diagnoses and all orders for this visit:  Fatigue, unspecified type  - much improved   Primary hypertension  - 117//90s  - not having a a lot of high bp readings.   - if elevated it is 2/2 what she ate the day prior  - more low bp and high  - manual 136/88  Mixed hyperlipidemia  - we discussed statin, she does not want to start  - she will increase fiber in diet  - she will increase exercise  - we will recheck lipids in 2-3 months  - may need vascepa as well  she is agreeable to this  Aneurysm of ascending aorta without rupture (CMS-HCC)  - 4.38 on last year echo   - continue good bp management  Vitamin D deficiency  - Vitamin D 25-Hydroxy,Total (for eval of Vitamin D levels); Future    Follow up in 6 month       Current Outpatient Medications:     hydrALAZINE (Apresoline) 25 mg tablet, Take 1 tablet (25 mg) by mouth 2 times a day. Take with food., Disp: 180 tablet, Rfl: 3    hydroCHLOROthiazide (HYDRODiuril) 25 mg tablet, Take 0.5 tablets (12.5 mg) by mouth once daily. as directed, Disp: 45 tablet, Rfl: 3    losartan (Cozaar) 100 mg tablet, Take 1 tablet (100 mg) by mouth once daily.,  Disp: 90 tablet, Rfl: 3    nebivolol (Bystolic) 20 mg tablet, Take 1 tablet (20 mg) by mouth once daily., Disp: 90 tablet, Rfl: 1    Exclusive of any other services or procedures performed, I, Deya HERRING, spent 30 minutes in duration for this visit today.  This time consisted of chart review, obtaining history, and/or performing the exam as documented above, as well as, documenting the clinical information for the encounter in the electronic record, discussing treatment options, plans, and/or goals with patient, family, and/or caregiver, refilling medications, updating the electronic record, ordering medicines, lab work, imaging, referrals, and/or procedures as documented above and communicating with other [facility] professionals. I have discussed the results of laboratory, radiology, and cardiology studies with the patient and their family/caregiver.

## 2024-07-16 ENCOUNTER — APPOINTMENT (OUTPATIENT)
Dept: PRIMARY CARE | Facility: CLINIC | Age: 74
End: 2024-07-16
Payer: MEDICARE

## 2024-07-16 VITALS
BODY MASS INDEX: 36.58 KG/M2 | OXYGEN SATURATION: 94 % | WEIGHT: 219.8 LBS | HEART RATE: 76 BPM | SYSTOLIC BLOOD PRESSURE: 122 MMHG | DIASTOLIC BLOOD PRESSURE: 80 MMHG

## 2024-07-16 DIAGNOSIS — I10 HYPERTENSION, UNSPECIFIED TYPE: ICD-10-CM

## 2024-07-16 PROBLEM — M25.561 PAIN IN RIGHT KNEE: Status: ACTIVE | Noted: 2024-07-16

## 2024-07-16 PROBLEM — R90.0 INTRACRANIAL MASS: Status: ACTIVE | Noted: 2022-05-06

## 2024-07-16 PROBLEM — M19.90 ARTHRITIS: Status: ACTIVE | Noted: 2023-03-03

## 2024-07-16 PROBLEM — M17.11 OSTEOARTHRITIS OF RIGHT KNEE: Status: ACTIVE | Noted: 2024-07-16

## 2024-07-16 PROBLEM — T75.3XXA MOTION SICKNESS: Status: ACTIVE | Noted: 2023-08-09

## 2024-07-16 PROBLEM — Z86.79 HISTORY OF HYPERTENSION: Status: ACTIVE | Noted: 2024-07-16

## 2024-07-16 PROBLEM — E66.9 OBESITY WITH BODY MASS INDEX 30 OR GREATER: Status: ACTIVE | Noted: 2024-07-16

## 2024-07-16 PROBLEM — R53.83 FATIGUE: Status: ACTIVE | Noted: 2023-03-03

## 2024-07-16 PROBLEM — Z98.890 POSTOPERATIVE STATE: Status: ACTIVE | Noted: 2024-07-16

## 2024-07-16 PROBLEM — E23.6 PITUITARY MASS (MULTI): Status: ACTIVE | Noted: 2024-07-16

## 2024-07-16 PROBLEM — H35.30 AGE-RELATED MACULAR DEGENERATION: Status: ACTIVE | Noted: 2024-07-16

## 2024-07-16 PROCEDURE — 3074F SYST BP LT 130 MM HG: CPT | Performed by: STUDENT IN AN ORGANIZED HEALTH CARE EDUCATION/TRAINING PROGRAM

## 2024-07-16 PROCEDURE — 1160F RVW MEDS BY RX/DR IN RCRD: CPT | Performed by: STUDENT IN AN ORGANIZED HEALTH CARE EDUCATION/TRAINING PROGRAM

## 2024-07-16 PROCEDURE — 1036F TOBACCO NON-USER: CPT | Performed by: STUDENT IN AN ORGANIZED HEALTH CARE EDUCATION/TRAINING PROGRAM

## 2024-07-16 PROCEDURE — 99213 OFFICE O/P EST LOW 20 MIN: CPT | Performed by: STUDENT IN AN ORGANIZED HEALTH CARE EDUCATION/TRAINING PROGRAM

## 2024-07-16 PROCEDURE — 1159F MED LIST DOCD IN RCRD: CPT | Performed by: STUDENT IN AN ORGANIZED HEALTH CARE EDUCATION/TRAINING PROGRAM

## 2024-07-16 PROCEDURE — 3079F DIAST BP 80-89 MM HG: CPT | Performed by: STUDENT IN AN ORGANIZED HEALTH CARE EDUCATION/TRAINING PROGRAM

## 2024-07-16 PROCEDURE — 1126F AMNT PAIN NOTED NONE PRSNT: CPT | Performed by: STUDENT IN AN ORGANIZED HEALTH CARE EDUCATION/TRAINING PROGRAM

## 2024-07-16 RX ORDER — NEBIVOLOL 20 MG/1
20 TABLET ORAL DAILY
Qty: 90 TABLET | Refills: 3 | Status: SHIPPED | OUTPATIENT
Start: 2024-07-16 | End: 2025-07-16

## 2024-07-16 RX ORDER — LOSARTAN POTASSIUM 100 MG/1
100 TABLET ORAL DAILY
Qty: 90 TABLET | Refills: 3 | Status: SHIPPED | OUTPATIENT
Start: 2024-07-16

## 2024-07-16 ASSESSMENT — ENCOUNTER SYMPTOMS: DEPRESSION: 0

## 2024-07-16 ASSESSMENT — PAIN SCALES - GENERAL: PAINLEVEL: 0-NO PAIN

## 2024-07-16 NOTE — PROGRESS NOTES
Subjective   Patient ID: Pattie Gutierrez is a 74 y.o. female who presents for Mass (Mass on back of right leg and both hips.) and Med Refill.    HPI comes in for blood pressure med refills.  Also she had some atypical spots right posterior thigh and bilateral groin region.    Review of Systems  Constitutional: NO F, chills, or sweats  Eyes: no blurred vision or visual disturbance  ENT: no hearing loss, no congestion, no nasal discharge, no hoarseness and no sore throat.   Cardiovascular: no chest pain, no edema, no palps and no syncope.   Respiratory: no cough,no s.o.b. and no wheezing  Gastrointestinal: no abdominal pain, No C/D no N/V, no blood in stools  Genitourinary: no dysuria, no change in urinary frequency, no urinary hesitancy and no feelings of urinary urgency.   Musculoskeletal: no arthralgias,  no back pain and no myalgias.   Integumentary: no new skin lesions and no rashes.   Neurological: no difficulty walking, no headache, no limb weakness, no numbness and no tingling.   Psychiatric: no anxiety, no depression, no anhedonia and no substance use disorders.   Endocrine: no recent weight gain and no recent weight loss.   Hematologic/Lymphatic: no tendency for easy bruising and no swollen glands.  Objective   /80 (BP Location: Left arm, Patient Position: Sitting, BP Cuff Size: Large adult)   Pulse 76   Wt 99.7 kg (219 lb 12.8 oz)   SpO2 94%   BMI 36.58 kg/m²     Physical Exam  gen- a & o x 3, nad, pleasant  skin-right posterior thigh the area seems to be mid hamstring muscle knot  Bilateral groin right more than left, muscle knots of the right upper quadrants  Assessment/Plan     #1.  Hypertension.  Med refills today

## 2024-07-31 ENCOUNTER — HOSPITAL ENCOUNTER (OUTPATIENT)
Dept: RADIOLOGY | Facility: CLINIC | Age: 74
Discharge: HOME | End: 2024-07-31
Payer: MEDICARE

## 2024-07-31 VITALS — BODY MASS INDEX: 35.45 KG/M2 | WEIGHT: 213 LBS

## 2024-07-31 DIAGNOSIS — G93.89 OTHER SPECIFIED DISORDERS OF BRAIN: ICD-10-CM

## 2024-07-31 PROCEDURE — 70553 MRI BRAIN STEM W/O & W/DYE: CPT

## 2024-07-31 PROCEDURE — 70553 MRI BRAIN STEM W/O & W/DYE: CPT | Performed by: RADIOLOGY

## 2024-07-31 PROCEDURE — 2550000001 HC RX 255 CONTRASTS: Performed by: NEUROLOGICAL SURGERY

## 2024-07-31 PROCEDURE — A9575 INJ GADOTERATE MEGLUMI 0.1ML: HCPCS | Performed by: NEUROLOGICAL SURGERY

## 2024-07-31 RX ORDER — GADOTERATE MEGLUMINE 376.9 MG/ML
20 INJECTION INTRAVENOUS
Status: COMPLETED | OUTPATIENT
Start: 2024-07-31 | End: 2024-07-31

## 2024-12-02 ENCOUNTER — APPOINTMENT (OUTPATIENT)
Dept: CARDIOLOGY | Facility: CLINIC | Age: 74
End: 2024-12-02
Payer: MEDICARE

## 2024-12-02 VITALS
WEIGHT: 214.8 LBS | HEIGHT: 65 IN | TEMPERATURE: 97.9 F | DIASTOLIC BLOOD PRESSURE: 109 MMHG | BODY MASS INDEX: 35.79 KG/M2 | HEART RATE: 66 BPM | SYSTOLIC BLOOD PRESSURE: 189 MMHG

## 2024-12-02 DIAGNOSIS — R53.83 FATIGUE, UNSPECIFIED TYPE: Primary | ICD-10-CM

## 2024-12-02 DIAGNOSIS — I10 PRIMARY HYPERTENSION: ICD-10-CM

## 2024-12-02 DIAGNOSIS — E78.2 MIXED HYPERLIPIDEMIA: ICD-10-CM

## 2024-12-02 DIAGNOSIS — I71.21 ANEURYSM OF ASCENDING AORTA WITHOUT RUPTURE (CMS-HCC): ICD-10-CM

## 2024-12-02 PROCEDURE — 3080F DIAST BP >= 90 MM HG: CPT | Performed by: NURSE PRACTITIONER

## 2024-12-02 PROCEDURE — 1036F TOBACCO NON-USER: CPT | Performed by: NURSE PRACTITIONER

## 2024-12-02 PROCEDURE — 1160F RVW MEDS BY RX/DR IN RCRD: CPT | Performed by: NURSE PRACTITIONER

## 2024-12-02 PROCEDURE — 3077F SYST BP >= 140 MM HG: CPT | Performed by: NURSE PRACTITIONER

## 2024-12-02 PROCEDURE — 99214 OFFICE O/P EST MOD 30 MIN: CPT | Performed by: NURSE PRACTITIONER

## 2024-12-02 PROCEDURE — 1159F MED LIST DOCD IN RCRD: CPT | Performed by: NURSE PRACTITIONER

## 2024-12-02 RX ORDER — OXYCODONE HYDROCHLORIDE 5 MG/1
TABLET ORAL
COMMUNITY
Start: 2024-11-14

## 2024-12-02 RX ORDER — ACETAMINOPHEN 325 MG/1
TABLET ORAL
COMMUNITY
Start: 2024-11-14

## 2024-12-02 RX ORDER — LOSARTAN POTASSIUM AND HYDROCHLOROTHIAZIDE 12.5; 5 MG/1; MG/1
1 TABLET ORAL DAILY
COMMUNITY

## 2024-12-02 NOTE — PROGRESS NOTES
"Chief Complaint:   Hypertension and TAA     History Of Present Illness:    Pattie Gutierrez is a 74 y.o. female here with hypertension and TAA.    Had surgery around 11/14. Has been losartan uninterrupted. However, blood pressure was \"low\" prior to surgery. She reports her BP was 115ish/70-60s. Hydrochlorothiazide was held. She just restarted    Started noticing BP was getting elevated over the past week per home monitoring.     Dizziness and lightheadedness remains improved.     Fatigue resolved with taking losartan at night.     Checking BP at home [id number] and noted it would be elevated. Taking losartan in PM.     TTE 4/3/2023  EF WNL  Moderate LVH  LA moderately dialted  Aortic valve sclerosis  Moderate dilation of ascending aorta (4.38)      Past Medical History:  She has a past medical history of Broken internal right knee prosthesis, initial encounter (CMS-[facility]) (01/17/2019), Cellulitis of right lower limb (09/21/2022), Hypertension (1980), Other abnormal and inconclusive findings on diagnostic imaging of breast (08/31/2020), Other adrenocortical insufficiency (Multi) (07/22/2022), Other conditions influencing health status, Personal history of diseases of the blood and blood-forming organs and certain disorders involving the immune mechanism (08/31/2020), Personal history of other diseases of the circulatory system, Personal history of other medical treatment, and Unspecified macular degeneration.    Past Surgical History:  She has a past surgical history that includes Rotator cuff repair (06/06/2017); Total knee arthroplasty (06/06/2017); Hysterectomy (06/06/2017); Appendectomy (06/06/2017); Hand tendon surgery (06/06/2017); and Colonoscopy (06/19/2018).      Social History:  She reports that she has quit smoking. Her smoking use included cigarettes. She has a 1.3 pack-year smoking history. She has never used smokeless tobacco. She reports that she does not drink alcohol and does not use drugs.    Family " "History:  Family History   Problem Relation Name Age of Onset    Hypertension Mother Tasha     Hyperthyroidism Mother Tasha     Hyperthyroidism Sister      Hypertension Sibling      Kidney disease Sibling      Thyroid disease Sibling       Allergies:  Ibuprofen, Tramadol, Acetaminophen, Amlodipine, Carvedilol, Cetirizine, Hydrocodone bitartrate, Hydrocodone-acetaminophen, Sulfa (sulfonamide antibiotics), and Verapamil    Review of Systems  All pertinent systems have been reviewed and are negative except for what is stated in the history of present illness.    All other systems have been reviewed and are negative and noncontributory to this patient's current ailments.     Visit Vitals  BP (!) 189/109   Pulse 66   Temp 36.6 °C (97.9 °F)   Ht 1.651 m (5' 5\")   Wt 97.4 kg (214 lb 12.8 oz)   BMI 35.74 kg/m²   Smoking Status Former   BSA 2.11 m²     Last Labs:  CBC -  Lab Results   Component Value Date    WBC 7.7 05/09/2024    HGB 15.2 05/09/2024    HCT 44.6 05/09/2024    MCV 89 05/09/2024     05/09/2024       CMP -  Lab Results   Component Value Date    CALCIUM 9.9 05/09/2024    PHOS 2.9 05/18/2022    PROT 6.9 01/03/2024    ALBUMIN 4.3 01/03/2024    AST 23 01/03/2024    ALT 25 01/03/2024    ALKPHOS 59 01/03/2024    BILITOT 0.8 01/03/2024    BUN 20 05/09/2024    CREATININE 1.02 05/09/2024       LIPID PANEL -   Lab Results   Component Value Date    CHOL 208 (H) 01/03/2024    TRIG 230 (H) 01/03/2024    HDL 39.8 01/03/2024    CHHDL 5.2 01/03/2024    LDLF 122 (H) 09/16/2022    VLDL 46 (H) 01/03/2024    NHDL 168 (H) 01/03/2024       RENAL FUNCTION PANEL -   Lab Results   Component Value Date    GLUCOSE 179 (H) 05/09/2024     05/09/2024    K 3.8 05/09/2024     05/09/2024    CO2 28 05/09/2024    ANIONGAP 16 05/09/2024    BUN 20 05/09/2024    CREATININE 1.02 05/09/2024    CALCIUM 9.9 05/09/2024    PHOS 2.9 05/18/2022    ALBUMIN 4.3 01/03/2024        No results found for: \"BNP\", \"HGBA1C\"      Objective "   Vitals reviewed.   Constitutional:       Appearance: Healthy appearance. Not in distress.   Eyes:      Conjunctiva/sclera: Conjunctivae normal.   Neck:      Vascular: No JVR. JVD normal.   Pulmonary:      Effort: Pulmonary effort is normal.      Breath sounds: Normal breath sounds. No wheezing. No rhonchi. No rales.   Chest:      Chest wall: Not tender to palpatation.   Cardiovascular:      PMI at left midclavicular line. Normal rate. Regular rhythm. Normal S1. Normal S2.       Murmurs: There is no murmur.      No gallop.  No click. No rub.   Edema:     Peripheral edema absent.   Abdominal:      General: Bowel sounds are normal.      Palpations: Abdomen is soft.      Tenderness: There is no abdominal tenderness.   Musculoskeletal: Normal range of motion.         General: No tenderness. Skin:     General: Skin is warm and dry.   Neurological:      General: No focal deficit present.      Mental Status: Alert and oriented to person, place and time.   Psychiatric:         Attention and Perception: Attention normal.         Mood and Affect: Mood normal.       Assessment/Plan   Diagnoses and all orders for this visit:     Primary hypertension  - home BP has been elevated over the past week  - today manual 170/100  - just had surgery, having joint pain and diagnosed wet AMD. She c.o feeling stressed   - I will have her monitor at home and keep me apprised. Was symptomatic when she was on hydrochlorothiazide 25mg. May need to switch to a different agent. Did not tolerate verapamil and amlodipine. Can consider hydralazine or alpha blocker   Mixed hyperlipidemia  - previously discussed statin, she does not want to start  - we will recheck lipids in 2-3 months  - may need vascepa as well, she is agreeable to this  Aneurysm of ascending aorta without rupture (CMS-HCC)  - 4.38 on last year echo   - continue good bp management  - annual TTE   Fatigue, unspecified type  - resolved    Follow up in 6 month with TTE  Will see  sooner if bp stays elevated     Outpatient Medications:  Current Outpatient Medications   Medication Instructions    acetaminophen (Tylenol) 325 mg tablet TAKE 2 TABLETS (650 MG) BY MOUTH EVERY 6 HOURS AS NEEDED FOR MILD PAIN (1-3) FOR UP TO 10 DAYS.    hydrALAZINE (APRESOLINE) 25 mg, oral, 2 times daily, Take with food.    hydroCHLOROthiazide (HYDRODIURIL) 12.5 mg, oral, Daily, as directed    losartan (COZAAR) 100 mg, oral, Daily    losartan-hydrochlorothiazide (Hyzaar) 50-12.5 mg tablet 1 capsule, Daily    nebivolol (BYSTOLIC) 20 mg, oral, Daily    oxyCODONE (Roxicodone) 5 mg immediate release tablet TAKE 1 TABLET (5 MG) BY MOUTH EVERY 6 HOURS AS NEEDED FOR SEVERE PAIN (7-10) FOR UP TO 5 DAYS.     Exclusive of any other services or procedures performed, I, Deya HERRING, spent 30 minutes in duration for this visit today.  This time consisted of chart review, obtaining history, and/or performing the exam as documented above, as well as, documenting the clinical information for the encounter in the electronic record, discussing treatment options, plans, and/or goals with patient, family, and/or caregiver, refilling medications, updating the electronic record, ordering medicines, lab work, imaging, referrals, and/or procedures as documented above and communicating with other [facility] professionals. I have discussed the results of laboratory, radiology, and cardiology studies with the patient and their family/caregiver.       I reviewed PCP notes, labs

## 2025-04-11 ENCOUNTER — OFFICE VISIT (OUTPATIENT)
Dept: URGENT CARE | Age: 75
End: 2025-04-11
Payer: MEDICARE

## 2025-04-11 VITALS
OXYGEN SATURATION: 95 % | BODY MASS INDEX: 35.11 KG/M2 | WEIGHT: 211 LBS | DIASTOLIC BLOOD PRESSURE: 92 MMHG | HEART RATE: 90 BPM | SYSTOLIC BLOOD PRESSURE: 143 MMHG

## 2025-04-11 DIAGNOSIS — I10 PRIMARY HYPERTENSION: ICD-10-CM

## 2025-04-11 DIAGNOSIS — Z20.822 SUSPECTED COVID-19 VIRUS INFECTION: ICD-10-CM

## 2025-04-11 DIAGNOSIS — R06.2 WHEEZING: Primary | ICD-10-CM

## 2025-04-11 DIAGNOSIS — R05.1 ACUTE COUGH: ICD-10-CM

## 2025-04-11 DIAGNOSIS — J22 LOWER RESPIRATORY TRACT INFECTION: ICD-10-CM

## 2025-04-11 LAB
POC CORONAVIRUS SARS-COV-2 PCR: NEGATIVE
POC HUMAN RHINOVIRUS PCR: NEGATIVE
POC INFLUENZA A VIRUS PCR: NEGATIVE
POC INFLUENZA B VIRUS PCR: NEGATIVE
POC RESPIRATORY SYNCYTIAL VIRUS PCR: NEGATIVE

## 2025-04-11 RX ORDER — METHYLPREDNISOLONE 4 MG/1
TABLET ORAL
Qty: 21 TABLET | Refills: 0 | Status: SHIPPED | OUTPATIENT
Start: 2025-04-11 | End: 2025-04-17

## 2025-04-11 RX ORDER — AZITHROMYCIN 250 MG/1
TABLET, FILM COATED ORAL
Qty: 6 TABLET | Refills: 0 | Status: SHIPPED | OUTPATIENT
Start: 2025-04-11 | End: 2025-04-16

## 2025-04-11 ASSESSMENT — ENCOUNTER SYMPTOMS
COUGH: 1
WHEEZING: 1
FEVER: 1
HEADACHES: 1
ABDOMINAL PAIN: 0

## 2025-04-11 NOTE — PROGRESS NOTES
Subjective   Patient ID: Pattie Gutierrez is a 74 y.o. female. They present today with a chief complaint of Illness (Cough, wheezing, fever, x1wk ).      Past Medical History  Allergies as of 04/11/2025 - Reviewed 04/11/2025   Allergen Reaction Noted    Ibuprofen Hives and Unknown 01/19/2015    Tramadol Other 09/28/2018    Amlodipine Unknown 03/03/2023    Carvedilol Other 09/21/2017    Cetirizine Unknown 03/03/2023    Hydrocodone bitartrate Hives 08/17/2009    Hydrocodone-acetaminophen Hives and Unknown 03/03/2023    Sulfa (sulfonamide antibiotics) Unknown 03/03/2023    Verapamil Other 09/21/2017       (Not in a hospital admission)       Past Medical History:   Diagnosis Date    Broken internal right knee prosthesis, initial encounter 01/17/2019    Failed total knee, right    Cellulitis of right lower limb 09/21/2022    Cellulitis of right lower extremity    Hypertension 1980    Other abnormal and inconclusive findings on diagnostic imaging of breast 08/31/2020    Abnormality of left breast on screening mammogram    Other adrenocortical insufficiency 07/22/2022    Secondary adrenal insufficiency    Other conditions influencing health status     Patient denies drug use    Personal history of diseases of the blood and blood-forming organs and certain disorders involving the immune mechanism 08/31/2020    History of anemia    Personal history of other diseases of the circulatory system     History of hypertension    Personal history of other medical treatment     History of mammogram    Unspecified macular degeneration     AMD (age related macular degeneration)       Past Surgical History:   Procedure Laterality Date    APPENDECTOMY  06/06/2017    Appendectomy    COLONOSCOPY  06/19/2018    Colonoscopy    HAND TENDON SURGERY  06/06/2017    Hand Incision Tendon Sheath Of A Finger    HYSTERECTOMY  06/06/2017    Hysterectomy    ROTATOR CUFF REPAIR  06/06/2017    Rotator Cuff Repair    TOTAL KNEE ARTHROPLASTY  06/06/2017    Knee  Replacement        reports that she has quit smoking. Her smoking use included cigarettes. She has a 1.3 pack-year smoking history. She has never used smokeless tobacco. She reports that she does not currently use alcohol. She reports that she does not use drugs.    Review of Systems  Review of Systems                               Objective    Vitals:    04/11/25 0905   BP: (!) 143/92   Pulse: 90   SpO2: 95%   Weight: 95.7 kg (211 lb)     No LMP recorded.    Physical Exam  Vitals reviewed.   Constitutional:       General: She is not in acute distress.  HENT:      Head: Normocephalic and atraumatic.      Right Ear: Tympanic membrane and ear canal normal. No tenderness.      Left Ear: Tympanic membrane and ear canal normal. No tenderness.      Nose: Congestion present.      Mouth/Throat:      Mouth: Mucous membranes are moist.      Pharynx: Oropharynx is clear. Uvula midline. No pharyngeal swelling, oropharyngeal exudate or posterior oropharyngeal erythema.   Eyes:      Extraocular Movements: Extraocular movements intact.      Conjunctiva/sclera: Conjunctivae normal.      Pupils: Pupils are equal, round, and reactive to light.   Cardiovascular:      Rate and Rhythm: Normal rate and regular rhythm.      Heart sounds: No murmur heard.  Pulmonary:      Effort: Pulmonary effort is normal.      Breath sounds: Wheezing present.   Skin:     General: Skin is warm.   Neurological:      Mental Status: She is alert and oriented to person, place, and time.   Psychiatric:         Mood and Affect: Mood normal.         Behavior: Behavior normal.             Point of Care Test & Imaging Results from this visit  Results for orders placed or performed in visit on 04/11/25   POCT SPOTFIRE R/ST Panel Mini w/COVID ([facility]) manually resulted    Specimen: Swab   Result Value Ref Range    POC Sars-Cov-2 PCR Negative Negative    POC Respiratory Syncytial Virus PCR Negative Negative    POC Influenza A Virus PCR Negative Negative    POC  Influenza B Virus PCR Negative Negative    POC Human Rhinovirus PCR Negative Negative      Imaging  No results found.    Cardiology, Vascular, and Other Imaging  No other imaging results found for the past 2 days      Diagnostic study results (if any) were reviewed by Krishna Cespedes PA-C.    Assessment/Plan   Allergies, medications, history, and pertinent labs/EKGs/Imaging reviewed by Krishna Cespedes PA-C.     Medical Decision Making  Negative COVID, flu A, flu B, RSV, rhinovirus.  Patient was started on Medrol Dosepak for wheezing and azithromycin for lower respiratory infection.  -         Patient is educated about their diagnoses.     -          Discussed medications benefits and adverse effects.     -          Answered all patient’s questions.     -          Patient will call 911 or go to the nearest ED if worsen symptoms .     -          Patient is agreeable to the plan of care and is deemed stable upon discharge.     -          Follow up with your primary care provider in two days.    Orders and Diagnoses  Diagnoses and all orders for this visit:  Wheezing  -     methylPREDNISolone (Medrol Dospak) 4 mg tablets; Take as directed on package.  Suspected COVID-19 virus infection  -     POCT SPOTFIRE R/ST Panel Mini w/COVID ([facility]) manually resulted  Acute cough  Lower respiratory tract infection  -     azithromycin (Zithromax) 250 mg tablet; Take 2 tabs (500 mg) by mouth today, than 1 daily for 4 days.      Medical Admin Record      Patient disposition: Home    Electronically signed by Krishna Cespedes PA-C  9:21 AM

## 2025-04-14 RX ORDER — HYDRALAZINE HYDROCHLORIDE 25 MG/1
25 TABLET, FILM COATED ORAL
Qty: 180 TABLET | Refills: 3 | Status: SHIPPED | OUTPATIENT
Start: 2025-04-14

## 2025-06-02 ENCOUNTER — APPOINTMENT (OUTPATIENT)
Dept: CARDIOLOGY | Facility: CLINIC | Age: 75
End: 2025-06-02
Payer: MEDICARE

## 2025-06-02 ENCOUNTER — HOSPITAL ENCOUNTER (OUTPATIENT)
Dept: CARDIOLOGY | Facility: CLINIC | Age: 75
Discharge: HOME | End: 2025-06-02
Payer: MEDICARE

## 2025-06-02 VITALS
SYSTOLIC BLOOD PRESSURE: 155 MMHG | BODY MASS INDEX: 36.32 KG/M2 | TEMPERATURE: 97.8 F | WEIGHT: 218 LBS | HEIGHT: 65 IN | DIASTOLIC BLOOD PRESSURE: 95 MMHG | HEART RATE: 61 BPM

## 2025-06-02 DIAGNOSIS — I71.21 ANEURYSM OF ASCENDING AORTA WITHOUT RUPTURE: ICD-10-CM

## 2025-06-02 DIAGNOSIS — E78.2 MIXED HYPERLIPIDEMIA: ICD-10-CM

## 2025-06-02 DIAGNOSIS — I10 HYPERTENSION, UNSPECIFIED TYPE: ICD-10-CM

## 2025-06-02 DIAGNOSIS — I10 PRIMARY HYPERTENSION: Primary | ICD-10-CM

## 2025-06-02 DIAGNOSIS — I71.20 THORACIC AORTIC ANEURYSM, WITHOUT RUPTURE, UNSPECIFIED: ICD-10-CM

## 2025-06-02 LAB
AORTIC VALVE PEAK VELOCITY: 1.47 M/S
AV PEAK GRADIENT: 9 MMHG
AVA (PEAK VEL): 2.67 CM2
EJECTION FRACTION APICAL 4 CHAMBER: 65
EJECTION FRACTION: 65 %
LEFT ATRIUM VOLUME AREA LENGTH INDEX BSA: 42.9 ML/M2
LEFT VENTRICLE INTERNAL DIMENSION DIASTOLE: 4.87 CM (ref 3.5–6)
LEFT VENTRICULAR OUTFLOW TRACT DIAMETER: 2.13 CM
MITRAL VALVE E/A RATIO: 0.85
RIGHT VENTRICLE FREE WALL PEAK S': 18 CM/S
TRICUSPID ANNULAR PLANE SYSTOLIC EXCURSION: 2.7 CM

## 2025-06-02 PROCEDURE — 1160F RVW MEDS BY RX/DR IN RCRD: CPT | Performed by: NURSE PRACTITIONER

## 2025-06-02 PROCEDURE — 1159F MED LIST DOCD IN RCRD: CPT | Performed by: NURSE PRACTITIONER

## 2025-06-02 PROCEDURE — 93306 TTE W/DOPPLER COMPLETE: CPT

## 2025-06-02 PROCEDURE — 3077F SYST BP >= 140 MM HG: CPT | Performed by: NURSE PRACTITIONER

## 2025-06-02 PROCEDURE — 1036F TOBACCO NON-USER: CPT | Performed by: NURSE PRACTITIONER

## 2025-06-02 PROCEDURE — 99214 OFFICE O/P EST MOD 30 MIN: CPT | Performed by: NURSE PRACTITIONER

## 2025-06-02 PROCEDURE — 3080F DIAST BP >= 90 MM HG: CPT | Performed by: NURSE PRACTITIONER

## 2025-06-02 PROCEDURE — 99212 OFFICE O/P EST SF 10 MIN: CPT | Mod: 25 | Performed by: NURSE PRACTITIONER

## 2025-06-02 PROCEDURE — 93306 TTE W/DOPPLER COMPLETE: CPT | Performed by: INTERNAL MEDICINE

## 2025-06-02 RX ORDER — NEBIVOLOL 20 MG/1
20 TABLET ORAL DAILY
Qty: 90 TABLET | Refills: 3 | Status: SHIPPED | OUTPATIENT
Start: 2025-06-02 | End: 2026-06-02

## 2025-06-02 RX ORDER — LOSARTAN POTASSIUM 100 MG/1
100 TABLET ORAL DAILY
Qty: 90 TABLET | Refills: 3 | Status: SHIPPED | OUTPATIENT
Start: 2025-06-02

## 2025-06-02 RX ORDER — HYDRALAZINE HYDROCHLORIDE 25 MG/1
25 TABLET, FILM COATED ORAL
Qty: 180 TABLET | Refills: 3 | Status: SHIPPED | OUTPATIENT
Start: 2025-06-02

## 2025-06-02 NOTE — PROGRESS NOTES
Chief Complaint:   Hypertension and TAA     History Of Present Illness:    Pattie Gutierrez is a 75 y.o. female here with hypertension and TAA.    No cardiac complaints. Denies dizziness and lightheadedness. Home BP well controlled.    TTE 4/3/2023  EF WNL  Moderate LVH  LA moderately dialted  Aortic valve sclerosis  Moderate dilation of ascending aorta (4.38)      Past Medical History:  She has a past medical history of Broken internal right knee prosthesis, initial encounter (01/17/2019), Cellulitis of right lower limb (09/21/2022), Hypertension (1980), Other abnormal and inconclusive findings on diagnostic imaging of breast (08/31/2020), Other adrenocortical insufficiency (07/22/2022), Other conditions influencing health status, Personal history of diseases of the blood and blood-forming organs and certain disorders involving the immune mechanism (08/31/2020), Personal history of other diseases of the circulatory system, Personal history of other medical treatment, and Unspecified macular degeneration.    Past Surgical History:  She has a past surgical history that includes Rotator cuff repair (06/06/2017); Total knee arthroplasty (06/06/2017); Hysterectomy (06/06/2017); Appendectomy (06/06/2017); Hand tendon surgery (06/06/2017); and Colonoscopy (06/19/2018).      Social History:  She reports that she has quit smoking. Her smoking use included cigarettes. She has a 1.3 pack-year smoking history. She has never used smokeless tobacco. She reports that she does not currently use alcohol. She reports that she does not use drugs.    Family History:  Family History   Problem Relation Name Age of Onset    Hypertension Mother Tasha     Hyperthyroidism Mother Tasha     Hyperthyroidism Sister      Hypertension Sibling      Kidney disease Sibling      Thyroid disease Sibling       Allergies:  Ibuprofen, Tramadol, Amlodipine, Carvedilol, Cetirizine, Hydrocodone bitartrate, Hydrocodone-acetaminophen, Sulfa (sulfonamide  "antibiotics), and Verapamil    Review of Systems  All pertinent systems have been reviewed and are negative except for what is stated in the history of present illness.    All other systems have been reviewed and are negative and noncontributory to this patient's current ailments.     Visit Vitals  BP (!) 155/95 (BP Location: Right arm)   Pulse 61   Temp 36.6 °C (97.8 °F)   Ht 1.651 m (5' 5\")   Wt 98.9 kg (218 lb)   BMI 36.28 kg/m²   Smoking Status Former   BSA 2.13 m²     Last Labs:  CBC -  Lab Results   Component Value Date    WBC 7.7 05/09/2024    HGB 15.2 05/09/2024    HCT 44.6 05/09/2024    MCV 89 05/09/2024     05/09/2024       CMP -  Lab Results   Component Value Date    CALCIUM 9.9 05/09/2024    PHOS 2.9 05/18/2022    PROT 6.9 01/03/2024    ALBUMIN 4.3 01/03/2024    AST 23 01/03/2024    ALT 25 01/03/2024    ALKPHOS 59 01/03/2024    BILITOT 0.8 01/03/2024    BUN 20 05/09/2024    CREATININE 1.02 05/09/2024       LIPID PANEL -   Lab Results   Component Value Date    CHOL 208 (H) 01/03/2024    TRIG 230 (H) 01/03/2024    HDL 39.8 01/03/2024    CHHDL 5.2 01/03/2024    LDLF 122 (H) 09/16/2022    VLDL 46 (H) 01/03/2024    NHDL 168 (H) 01/03/2024       RENAL FUNCTION PANEL -   Lab Results   Component Value Date    GLUCOSE 179 (H) 05/09/2024     05/09/2024    K 3.8 05/09/2024     05/09/2024    CO2 28 05/09/2024    ANIONGAP 16 05/09/2024    BUN 20 05/09/2024    CREATININE 1.02 05/09/2024    CALCIUM 9.9 05/09/2024    PHOS 2.9 05/18/2022    ALBUMIN 4.3 01/03/2024        No results found for: \"BNP\", \"HGBA1C\"      Objective   Vitals reviewed.   Constitutional:       Appearance: Healthy appearance. Not in distress.   Eyes:      Conjunctiva/sclera: Conjunctivae normal.   Neck:      Vascular: No JVR. JVD normal.   Pulmonary:      Effort: Pulmonary effort is normal.      Breath sounds: Normal breath sounds. No wheezing. No rhonchi. No rales.   Chest:      Chest wall: Not tender to palpatation. "   Cardiovascular:      PMI at left midclavicular line. Normal rate. Regular rhythm. Normal S1. Normal S2.       Murmurs: There is no murmur.      No gallop.  No click. No rub.   Edema:     Peripheral edema absent.   Abdominal:      General: Bowel sounds are normal.      Palpations: Abdomen is soft.      Tenderness: There is no abdominal tenderness.   Musculoskeletal: Normal range of motion.         General: No tenderness. Skin:     General: Skin is warm and dry.   Neurological:      General: No focal deficit present.      Mental Status: Alert and oriented to person, place and time.   Psychiatric:         Attention and Perception: Attention normal.         Mood and Affect: Mood normal.       Assessment/Plan   Diagnoses and all orders for this visit:     Primary hypertension  - home BP well controlled 112-135/78-96  - continue losartan, hydrochlorothiazide, hydralazine, nebivolol   Mixed hyperlipidemia  - previously discussed statin, she does not want to start  Aneurysm of ascending aorta without rupture (CMS-HCC)  - 4.38 on last year echo   - 4.5mm today  - continue good bp management  - annual TTE       Follow up in 1 year with TTE    Outpatient Medications:  Current Outpatient Medications   Medication Instructions    hydrALAZINE (APRESOLINE) 25 mg, oral, 2 times daily (morning and late afternoon)    hydroCHLOROthiazide (HYDRODIURIL) 12.5 mg, oral, Daily, as directed    losartan (COZAAR) 100 mg, oral, Daily    nebivolol (BYSTOLIC) 20 mg, oral, Daily     Exclusive of any other services or procedures performed, Deya SIERRA, spent 20 minutes in duration for this visit today.  This time consisted of chart review, obtaining history, and/or performing the exam as documented above, as well as, documenting the clinical information for the encounter in the electronic record, discussing treatment options, plans, and/or goals with patient, family, and/or caregiver, refilling medications, updating the electronic  record, ordering medicines, lab work, imaging, referrals, and/or procedures as documented above and communicating with other [facility] professionals. I have discussed the results of laboratory, radiology, and cardiology studies with the patient and their family/caregiver.       I reviewed PCP notes, labs

## 2026-06-10 ENCOUNTER — APPOINTMENT (OUTPATIENT)
Dept: CARDIOLOGY | Facility: CLINIC | Age: 76
End: 2026-06-10
Payer: MEDICARE